# Patient Record
Sex: FEMALE | Race: ASIAN | Employment: FULL TIME | URBAN - METROPOLITAN AREA
[De-identification: names, ages, dates, MRNs, and addresses within clinical notes are randomized per-mention and may not be internally consistent; named-entity substitution may affect disease eponyms.]

---

## 2022-02-08 ENCOUNTER — OFFICE VISIT (OUTPATIENT)
Dept: INTERNAL MEDICINE CLINIC | Facility: CLINIC | Age: 52
End: 2022-02-08
Payer: COMMERCIAL

## 2022-02-08 ENCOUNTER — LAB ENCOUNTER (OUTPATIENT)
Dept: LAB | Age: 52
End: 2022-02-08
Attending: INTERNAL MEDICINE
Payer: COMMERCIAL

## 2022-02-08 VITALS
TEMPERATURE: 99 F | HEART RATE: 64 BPM | HEIGHT: 65 IN | SYSTOLIC BLOOD PRESSURE: 120 MMHG | BODY MASS INDEX: 22.33 KG/M2 | WEIGHT: 134 LBS | DIASTOLIC BLOOD PRESSURE: 80 MMHG

## 2022-02-08 DIAGNOSIS — R52 PAIN: ICD-10-CM

## 2022-02-08 DIAGNOSIS — L65.9 ALOPECIA: ICD-10-CM

## 2022-02-08 DIAGNOSIS — R10.31 RIGHT GROIN PAIN: ICD-10-CM

## 2022-02-08 DIAGNOSIS — Z00.00 PHYSICAL EXAM: ICD-10-CM

## 2022-02-08 DIAGNOSIS — I10 HYPERTENSION, UNSPECIFIED TYPE: ICD-10-CM

## 2022-02-08 DIAGNOSIS — Z00.00 PHYSICAL EXAM: Primary | ICD-10-CM

## 2022-02-08 LAB
ALBUMIN SERPL-MCNC: 4.1 G/DL (ref 3.4–5)
ALBUMIN/GLOB SERPL: 1.3 {RATIO} (ref 1–2)
ALP LIVER SERPL-CCNC: 74 U/L
ALT SERPL-CCNC: 16 U/L
ANION GAP SERPL CALC-SCNC: 5 MMOL/L (ref 0–18)
BASOPHILS # BLD AUTO: 0.03 X10(3) UL (ref 0–0.2)
BASOPHILS NFR BLD AUTO: 0.5 %
BILIRUB SERPL-MCNC: 0.6 MG/DL (ref 0.1–2)
BUN BLD-MCNC: 9 MG/DL (ref 7–18)
BUN/CREAT SERPL: 23.1 (ref 10–20)
CALCIUM BLD-MCNC: 9 MG/DL (ref 8.5–10.1)
CHOLEST SERPL-MCNC: 175 MG/DL (ref ?–200)
CO2 SERPL-SCNC: 30 MMOL/L (ref 21–32)
CREAT BLD-MCNC: 0.39 MG/DL
DEPRECATED RDW RBC AUTO: 43.1 FL (ref 35.1–46.3)
EOSINOPHIL # BLD AUTO: 0.04 X10(3) UL (ref 0–0.7)
EOSINOPHIL NFR BLD AUTO: 0.6 %
ERYTHROCYTE [DISTWIDTH] IN BLOOD BY AUTOMATED COUNT: 12.5 % (ref 11–15)
EST. AVERAGE GLUCOSE BLD GHB EST-MCNC: 105 MG/DL (ref 68–126)
FASTING PATIENT LIPID ANSWER: YES
FASTING STATUS PATIENT QL REPORTED: YES
GLOBULIN PLAS-MCNC: 3.2 G/DL (ref 2.8–4.4)
GLUCOSE BLD-MCNC: 81 MG/DL (ref 70–99)
HBA1C MFR BLD: 5.3 % (ref ?–5.7)
HCT VFR BLD AUTO: 39.8 %
HDLC SERPL-MCNC: 49 MG/DL (ref 40–59)
HGB BLD-MCNC: 13.4 G/DL
IMM GRANULOCYTES # BLD AUTO: 0.02 X10(3) UL (ref 0–1)
IMM GRANULOCYTES NFR BLD: 0.3 %
LDLC SERPL CALC-MCNC: 116 MG/DL (ref ?–100)
LYMPHOCYTES # BLD AUTO: 1.6 X10(3) UL (ref 1–4)
LYMPHOCYTES NFR BLD AUTO: 26 %
MCH RBC QN AUTO: 31.5 PG (ref 26–34)
MCHC RBC AUTO-ENTMCNC: 33.7 G/DL (ref 31–37)
MCV RBC AUTO: 93.6 FL
MONOCYTES # BLD AUTO: 0.41 X10(3) UL (ref 0.1–1)
NEUTROPHILS # BLD AUTO: 4.06 X10 (3) UL (ref 1.5–7.7)
NEUTROPHILS # BLD AUTO: 4.06 X10(3) UL (ref 1.5–7.7)
NEUTROPHILS NFR BLD AUTO: 65.9 %
NONHDLC SERPL-MCNC: 126 MG/DL (ref ?–130)
OSMOLALITY SERPL CALC.SUM OF ELEC: 290 MOSM/KG (ref 275–295)
PLATELET # BLD AUTO: 235 10(3)UL (ref 150–450)
POTASSIUM SERPL-SCNC: 3.5 MMOL/L (ref 3.5–5.1)
PROT SERPL-MCNC: 7.3 G/DL (ref 6.4–8.2)
RBC # BLD AUTO: 4.25 X10(6)UL
SODIUM SERPL-SCNC: 141 MMOL/L (ref 136–145)
TRIGL SERPL-MCNC: 50 MG/DL (ref 30–149)
TSI SER-ACNC: 1.56 MIU/ML (ref 0.36–3.74)
VLDLC SERPL CALC-MCNC: 9 MG/DL (ref 0–30)
WBC # BLD AUTO: 6.2 X10(3) UL (ref 4–11)

## 2022-02-08 PROCEDURE — 36415 COLL VENOUS BLD VENIPUNCTURE: CPT

## 2022-02-08 PROCEDURE — 85025 COMPLETE CBC W/AUTO DIFF WBC: CPT

## 2022-02-08 PROCEDURE — 99386 PREV VISIT NEW AGE 40-64: CPT | Performed by: INTERNAL MEDICINE

## 2022-02-08 PROCEDURE — 3074F SYST BP LT 130 MM HG: CPT | Performed by: INTERNAL MEDICINE

## 2022-02-08 PROCEDURE — 80053 COMPREHEN METABOLIC PANEL: CPT

## 2022-02-08 PROCEDURE — 3008F BODY MASS INDEX DOCD: CPT | Performed by: INTERNAL MEDICINE

## 2022-02-08 PROCEDURE — 83036 HEMOGLOBIN GLYCOSYLATED A1C: CPT

## 2022-02-08 PROCEDURE — 80061 LIPID PANEL: CPT

## 2022-02-08 PROCEDURE — 84443 ASSAY THYROID STIM HORMONE: CPT

## 2022-02-08 PROCEDURE — 3079F DIAST BP 80-89 MM HG: CPT | Performed by: INTERNAL MEDICINE

## 2022-02-08 RX ORDER — AMLODIPINE BESYLATE 10 MG/1
10 TABLET ORAL DAILY
COMMUNITY

## 2022-03-30 ENCOUNTER — LAB ENCOUNTER (OUTPATIENT)
Dept: LAB | Age: 52
End: 2022-03-30
Attending: INTERNAL MEDICINE
Payer: COMMERCIAL

## 2022-03-30 ENCOUNTER — OFFICE VISIT (OUTPATIENT)
Dept: DERMATOLOGY CLINIC | Facility: CLINIC | Age: 52
End: 2022-03-30
Payer: COMMERCIAL

## 2022-03-30 ENCOUNTER — HOSPITAL ENCOUNTER (OUTPATIENT)
Dept: MAMMOGRAPHY | Facility: HOSPITAL | Age: 52
Discharge: HOME | End: 2022-03-30
Attending: INTERNAL MEDICINE
Payer: COMMERCIAL

## 2022-03-30 DIAGNOSIS — M25.50 ARTHRALGIA, UNSPECIFIED JOINT: ICD-10-CM

## 2022-03-30 DIAGNOSIS — L65.9 ALOPECIA: Primary | ICD-10-CM

## 2022-03-30 DIAGNOSIS — L65.9 ALOPECIA: ICD-10-CM

## 2022-03-30 DIAGNOSIS — L30.9 DERMATITIS: ICD-10-CM

## 2022-03-30 DIAGNOSIS — Z00.00 PHYSICAL EXAM: ICD-10-CM

## 2022-03-30 LAB
CRP SERPL-MCNC: <0.29 MG/DL (ref ?–0.3)
DEPRECATED HBV CORE AB SER IA-ACNC: 67.7 NG/ML
DHEA-S SERPL-MCNC: 90.6 UG/DL
HCYS SERPL-SCNC: 2.4 UMOL/L (ref 3.2–10.7)
VIT B12 SERPL-MCNC: 577 PG/ML (ref 193–986)
VIT D+METAB SERPL-MCNC: 17.1 NG/ML (ref 30–100)

## 2022-03-30 PROCEDURE — 99204 OFFICE O/P NEW MOD 45 MIN: CPT | Performed by: DERMATOLOGY

## 2022-03-30 PROCEDURE — 83921 ORGANIC ACID SINGLE QUANT: CPT

## 2022-03-30 PROCEDURE — 84402 ASSAY OF FREE TESTOSTERONE: CPT

## 2022-03-30 PROCEDURE — 86039 ANTINUCLEAR ANTIBODIES (ANA): CPT

## 2022-03-30 PROCEDURE — 36415 COLL VENOUS BLD VENIPUNCTURE: CPT

## 2022-03-30 PROCEDURE — 86235 NUCLEAR ANTIGEN ANTIBODY: CPT

## 2022-03-30 PROCEDURE — 82728 ASSAY OF FERRITIN: CPT

## 2022-03-30 PROCEDURE — 83090 ASSAY OF HOMOCYSTEINE: CPT

## 2022-03-30 PROCEDURE — 84403 ASSAY OF TOTAL TESTOSTERONE: CPT

## 2022-03-30 PROCEDURE — 86140 C-REACTIVE PROTEIN: CPT

## 2022-03-30 PROCEDURE — 86038 ANTINUCLEAR ANTIBODIES: CPT

## 2022-03-30 PROCEDURE — 86225 DNA ANTIBODY NATIVE: CPT

## 2022-03-30 PROCEDURE — 77067 SCR MAMMO BI INCL CAD: CPT | Performed by: INTERNAL MEDICINE

## 2022-03-30 PROCEDURE — 77063 BREAST TOMOSYNTHESIS BI: CPT | Performed by: INTERNAL MEDICINE

## 2022-03-30 PROCEDURE — 82306 VITAMIN D 25 HYDROXY: CPT

## 2022-03-30 PROCEDURE — 82607 VITAMIN B-12: CPT

## 2022-03-30 PROCEDURE — 82627 DEHYDROEPIANDROSTERONE: CPT

## 2022-03-30 RX ORDER — FLUOCINONIDE 0.5 MG/G
1 OINTMENT TOPICAL 2 TIMES DAILY
Qty: 30 G | Refills: 2 | Status: SHIPPED | OUTPATIENT
Start: 2022-03-30 | End: 2022-04-29

## 2022-03-31 LAB — NUCLEAR IGG TITR SER IF: POSITIVE {TITER}

## 2022-04-01 LAB — DSDNA AB TITR SER: <10 {TITER}

## 2022-04-02 LAB — ANA NUCLEOLAR TITR SER IF: 320 {TITER}

## 2022-04-03 LAB — MMA: <0.1 UMOL/L

## 2022-04-06 LAB
ENA SM IGG SER QL: NEGATIVE
ENA SM+RNP AB SER QL: NEGATIVE
ENA SS-A AB SER QL IA: NEGATIVE
ENA SS-B AB SER QL IA: NEGATIVE
TESTOSTERONE, FREE, S: 0.17 NG/DL
TESTOSTERONE, TOTAL, S: 10 NG/DL

## 2022-04-13 ENCOUNTER — TELEPHONE (OUTPATIENT)
Dept: DERMATOLOGY CLINIC | Facility: CLINIC | Age: 52
End: 2022-04-13

## 2022-04-13 NOTE — TELEPHONE ENCOUNTER
Patient reviewed her recent  test results via [address] [place] [address]. Wants to discuss results with dr CARDOZA [facility] nurse.

## 2022-04-19 ENCOUNTER — TELEPHONE (OUTPATIENT)
Dept: DERMATOLOGY CLINIC | Facility: CLINIC | Age: 52
End: 2022-04-19

## 2022-04-21 ENCOUNTER — OFFICE VISIT (OUTPATIENT)
Dept: GASTROENTEROLOGY | Facility: CLINIC | Age: 52
End: 2022-04-21
Payer: COMMERCIAL

## 2022-04-21 VITALS
HEIGHT: 65 IN | BODY MASS INDEX: 22.36 KG/M2 | WEIGHT: 134.19 LBS | DIASTOLIC BLOOD PRESSURE: 90 MMHG | HEART RATE: 82 BPM | SYSTOLIC BLOOD PRESSURE: 134 MMHG

## 2022-04-21 DIAGNOSIS — R10.10 UPPER ABDOMINAL PAIN: ICD-10-CM

## 2022-04-21 DIAGNOSIS — Z12.11 SCREENING FOR COLORECTAL CANCER: ICD-10-CM

## 2022-04-21 DIAGNOSIS — Z12.12 SCREENING FOR COLORECTAL CANCER: ICD-10-CM

## 2022-04-21 DIAGNOSIS — R93.2 ABNORMAL CT SCAN, GALLBLADDER: Primary | ICD-10-CM

## 2022-04-21 PROCEDURE — 3008F BODY MASS INDEX DOCD: CPT | Performed by: INTERNAL MEDICINE

## 2022-04-21 PROCEDURE — 99243 OFF/OP CNSLTJ NEW/EST LOW 30: CPT | Performed by: INTERNAL MEDICINE

## 2022-04-21 PROCEDURE — 3075F SYST BP GE 130 - 139MM HG: CPT | Performed by: INTERNAL MEDICINE

## 2022-04-21 PROCEDURE — 3080F DIAST BP >= 90 MM HG: CPT | Performed by: INTERNAL MEDICINE

## 2022-04-23 ENCOUNTER — TELEPHONE (OUTPATIENT)
Dept: GASTROENTEROLOGY | Facility: CLINIC | Age: 52
End: 2022-04-23

## 2022-04-23 DIAGNOSIS — R10.10 UPPER ABDOMINAL PAIN: Primary | ICD-10-CM

## 2022-04-23 DIAGNOSIS — Z12.11 SCREEN FOR COLON CANCER: ICD-10-CM

## 2022-04-23 NOTE — TELEPHONE ENCOUNTER
Please contact the patient to schedule a screening colonoscopy and an EGD for upper abdominal pain following a split dose MiraLAX/Gatorade preparation and monitored anesthesia care.

## 2022-04-23 NOTE — PATIENT INSTRUCTIONS
1. Proceed with gallbladder ultrasound as scheduled. 2.  I will have the office contact you to arrange a screening colonoscopy and an upper endoscopy to investigate upper abdominal pain.

## 2022-04-24 ENCOUNTER — PATIENT MESSAGE (OUTPATIENT)
Dept: INTERNAL MEDICINE CLINIC | Facility: CLINIC | Age: 52
End: 2022-04-24

## 2022-04-25 ENCOUNTER — HOSPITAL ENCOUNTER (OUTPATIENT)
Dept: ULTRASOUND IMAGING | Facility: HOSPITAL | Age: 52
Discharge: HOME | End: 2022-04-25
Attending: INTERNAL MEDICINE
Payer: COMMERCIAL

## 2022-04-25 ENCOUNTER — TELEPHONE (OUTPATIENT)
Dept: INTERNAL MEDICINE CLINIC | Facility: CLINIC | Age: 52
End: 2022-04-25

## 2022-04-25 DIAGNOSIS — R52 PAIN: ICD-10-CM

## 2022-04-25 PROCEDURE — 76700 US EXAM ABDOM COMPLETE: CPT | Performed by: INTERNAL MEDICINE

## 2022-04-25 NOTE — TELEPHONE ENCOUNTER
I spoke with patient and relayed MDs message-verbalized understanding. She states that she saw Dr. Glory Haynes for Alopecia. She mentioned her hip, neck and hand pain. Dr. Glory Haynes ordered labs. JULI was elevated and patient was referred to rheumatology. If okay, she plans to see rheumatologist first and then f/u with Dr. Kennedy Larry in-office to re-eval treatment plan. She asked about Rheumatology recommendation. To Dr. Kamla Rodriguez: I mentioned Dr. Berenice Mejia. However, do you have any personal recommendations?

## 2022-04-25 NOTE — TELEPHONE ENCOUNTER
The ultrasound ordered as an abdominal ultrasound--this will not be looking at the hip as ultrasound is not a good test for hip or muscle evaluation. At patient's appointment in February we talked about her right hip pain being most likely musculoskeletal and recommended physical therapy--I do not see that patient went for this. If she continues to have right hip pain, we should pursue physical therapy.   If no improvement with physical therapy, will need follow-up office visit for evaluation

## 2022-04-26 NOTE — TELEPHONE ENCOUNTER
Patient called and relayed Dr Raimundo Calvert message. Patient verbalized understanding with no further questions noted.

## 2022-05-10 ENCOUNTER — HOSPITAL ENCOUNTER (OUTPATIENT)
Dept: GENERAL RADIOLOGY | Facility: HOSPITAL | Age: 52
Discharge: HOME | End: 2022-05-10
Attending: INTERNAL MEDICINE
Payer: COMMERCIAL

## 2022-05-10 ENCOUNTER — PATIENT MESSAGE (OUTPATIENT)
Dept: INTERNAL MEDICINE CLINIC | Facility: CLINIC | Age: 52
End: 2022-05-10

## 2022-05-10 ENCOUNTER — OFFICE VISIT (OUTPATIENT)
Dept: INTERNAL MEDICINE CLINIC | Facility: CLINIC | Age: 52
End: 2022-05-10
Payer: COMMERCIAL

## 2022-05-10 VITALS
TEMPERATURE: 98 F | HEART RATE: 77 BPM | WEIGHT: 134.63 LBS | DIASTOLIC BLOOD PRESSURE: 88 MMHG | OXYGEN SATURATION: 98 % | HEIGHT: 65 IN | BODY MASS INDEX: 22.43 KG/M2 | SYSTOLIC BLOOD PRESSURE: 138 MMHG

## 2022-05-10 DIAGNOSIS — R76.8 ANA POSITIVE: ICD-10-CM

## 2022-05-10 DIAGNOSIS — R10.31 RIGHT GROIN PAIN: Primary | ICD-10-CM

## 2022-05-10 DIAGNOSIS — R10.31 RIGHT GROIN PAIN: ICD-10-CM

## 2022-05-10 DIAGNOSIS — I10 HYPERTENSION, UNSPECIFIED TYPE: ICD-10-CM

## 2022-05-10 DIAGNOSIS — E55.9 VITAMIN D DEFICIENCY: ICD-10-CM

## 2022-05-10 PROCEDURE — 3008F BODY MASS INDEX DOCD: CPT | Performed by: INTERNAL MEDICINE

## 2022-05-10 PROCEDURE — 3079F DIAST BP 80-89 MM HG: CPT | Performed by: INTERNAL MEDICINE

## 2022-05-10 PROCEDURE — 3075F SYST BP GE 130 - 139MM HG: CPT | Performed by: INTERNAL MEDICINE

## 2022-05-10 PROCEDURE — 73502 X-RAY EXAM HIP UNI 2-3 VIEWS: CPT | Performed by: INTERNAL MEDICINE

## 2022-05-10 PROCEDURE — 99213 OFFICE O/P EST LOW 20 MIN: CPT | Performed by: INTERNAL MEDICINE

## 2022-05-10 RX ORDER — ERGOCALCIFEROL 1.25 MG/1
50000 CAPSULE ORAL WEEKLY
Qty: 12 CAPSULE | Refills: 1 | Status: SHIPPED | OUTPATIENT
Start: 2022-05-10

## 2022-05-10 NOTE — TELEPHONE ENCOUNTER
From: Kristel Zee  To: Clara Gandhi MD  Sent: 5/10/2022 2:49 PM CDT  Subject: [place] Referral Request    Hi Dr. Johana Lynch requires lab results, any progress notes and written referral before they assign a rheumatologist. Their fax is . Let me know if you have any questions. I will ensure they communicate findings with you directly.      Jesse Menchaca   [phone]

## 2022-05-11 NOTE — TELEPHONE ENCOUNTER
Rheum referral along with derm notes and labs faxed to [facility] rheumatology at number below, confirmation received.

## 2022-07-06 RX ORDER — SODIUM PICOSULFATE, MAGNESIUM OXIDE, AND ANHYDROUS CITRIC ACID 10; 3.5; 12 MG/160ML; G/160ML; G/160ML
1 LIQUID ORAL ONCE
Qty: 1 EACH | Refills: 0 | Status: SHIPPED | OUTPATIENT
Start: 2022-07-06 | End: 2022-07-06

## 2022-07-07 ENCOUNTER — TELEPHONE (OUTPATIENT)
Dept: GASTROENTEROLOGY | Facility: CLINIC | Age: 52
End: 2022-07-07

## 2022-07-08 ENCOUNTER — HOSPITAL ENCOUNTER (EMERGENCY)
Facility: HOSPITAL | Age: 52
Discharge: HOME | End: 2022-07-08
Attending: EMERGENCY MEDICINE
Payer: COMMERCIAL

## 2022-07-08 ENCOUNTER — TELEPHONE (OUTPATIENT)
Dept: INTERNAL MEDICINE CLINIC | Facility: CLINIC | Age: 52
End: 2022-07-08

## 2022-07-08 VITALS
SYSTOLIC BLOOD PRESSURE: 151 MMHG | OXYGEN SATURATION: 97 % | HEART RATE: 96 BPM | RESPIRATION RATE: 16 BRPM | TEMPERATURE: 100 F | DIASTOLIC BLOOD PRESSURE: 106 MMHG

## 2022-07-08 DIAGNOSIS — U07.1 COVID-19 VIRUS INFECTION: Primary | ICD-10-CM

## 2022-07-08 LAB — SARS-COV-2 RNA RESP QL NAA+PROBE: DETECTED

## 2022-07-08 PROCEDURE — 99283 EMERGENCY DEPT VISIT LOW MDM: CPT

## 2022-07-08 RX ORDER — ACETAMINOPHEN 500 MG
1000 TABLET ORAL ONCE
Status: COMPLETED | OUTPATIENT
Start: 2022-07-08 | End: 2022-07-08

## 2022-07-08 NOTE — TELEPHONE ENCOUNTER
Patient calling for Covid test.  Did home Covid/Flu test results negative for both flu and covid  Patient feels she may not have put swab in far enough. Three days of fever. Has fever today of 101.2., body aches, sore throat and fatigue. No known exposure to Covid, has been very careful. Asking for Covid Antibody medication.     Patient has lupus    [facility]    Best call back number [phone]

## 2022-07-08 NOTE — TELEPHONE ENCOUNTER
Spoke to patient and relayed MD message. Pt verbalized understanding and agrees with plan. Will be going to UC today.

## 2022-07-08 NOTE — TELEPHONE ENCOUNTER
To Dr Alba Cardenas  Please advise        Respiratory infection triage:  7/5 Negative home test   7/6 Negative PCR does not feel that she placed the swab far enough the nostril bc she sneezed. Fever:  []  No fever  [x]  Fever>100.4 101.2 this am,  highest fever 101.7 last night  Cough:  [] Tight cough  [x] Cough with exertion  [] Dry cough  [x] Sputum production, Color: yellow phlegm  Breathing:  [x] Mild shortness of breath interfering with activity  [] Wheezing  [] Pain with deep breathing  [] Using inhaler    Other symptoms:  [x] Sore throat  [x] Difficulty swallowing  [x] Nasal drainage/congestion  Yellow nasal discharge  [x] Sinus congestion/pressure  [] Ear pain  [x] Body aches and fatigue  [] Poor appetite  [] Loss of sense of smell   [] Loss of sense of taste  []Conjunctivitis? [] Any recent travel? [] Any sick contacts? [] Are you a healthcare worker? ADDITIONAL NOTES:  Also C/O frontal headache and nausea, no vomitting. Taking ibuprofen 600 mg 4 times a day. Requesting COVBRIANA nixon / Javy You due to her Lupus             Notified patient that we will route this message to the doctor and see what their recommendations would be. In the meantime, if anything worsens, they were advised to call back or seek emergent evaluation.

## 2022-07-08 NOTE — TELEPHONE ENCOUNTER
We would not prescribe COVID medication unless we knew patient is COVID-positive. She has had negative home COVID test.  With her symptoms, I recommend urgent care or ER visit so that we can do PCR test and decide if any additional treatment is needed.

## 2022-07-13 NOTE — TELEPHONE ENCOUNTER
Schedulers:  Patient called to follow up on scheduling procedure. Please call  (positive covid on 7/8/2022 so would have to follow protocol)    Thank you    Royal Alfredo 4:11 PM  Note  Patient is calling to follow up on scheduling procedure.

## 2022-07-13 NOTE — TELEPHONE ENCOUNTER
Please see 4/23/2022 encounter for further documentation to avoid confusion about scheduling colonoscopy/EGD since orders in that encounter.

## 2022-07-14 RX ORDER — SODIUM PICOSULFATE, MAGNESIUM OXIDE, AND ANHYDROUS CITRIC ACID 10; 3.5; 12 MG/160ML; G/160ML; G/160ML
1 LIQUID ORAL ONCE
Qty: 1 EACH | Refills: 0 | Status: SHIPPED | OUTPATIENT
Start: 2022-07-14 | End: 2022-07-14

## 2022-07-14 NOTE — TELEPHONE ENCOUNTER
Scheduled for:  Colonoscopy [phone] & EGD [address]  Provider Name:  Dr. Temi Elias  Date:  12/9/2022  Location:  [facility]  Sedation:  MAC  Time:  1:00 pm, (pt is aware that [facility] will call the day before to confirm arrival time)  Prep:  Msg sent to   Meds/Allergies Reconciled?: Yes  Diagnosis with codes:  Upper abdominal pain R10.10; Screening for colon cancer Z12.11  Was patient informed to call insurance with codes (Y/N):  Yes  Referral sent?:  Yes  [facility] or [facility]  notified?:  Electronic case request was sent to [facility] via [employer]. Medication Orders:  N/A  Misc Orders:  Patient was informed that they will need a COVID 19 test prior to their procedure. Patient verbally understood & will await a phone call from [facility] to schedule. Further instructions given by staff: Informed patient that prep instructions will be sent via [employer] once we get response on prep.

## 2022-07-14 NOTE — TELEPHONE ENCOUNTER
Dr. Ashleigh Quesada -    Patient is wondering if she can do the same prep that her mom will be doing which looks to be Clenpiq instead of Miralax/Gatorade. Please advise, thank you!

## 2022-08-18 ENCOUNTER — MED REC SCAN ONLY (OUTPATIENT)
Dept: INTERNAL MEDICINE CLINIC | Facility: CLINIC | Age: 52
End: 2022-08-18

## 2022-10-20 ENCOUNTER — TELEPHONE (OUTPATIENT)
Dept: INTERNAL MEDICINE CLINIC | Facility: CLINIC | Age: 52
End: 2022-10-20

## 2022-10-20 ENCOUNTER — TELEPHONE (OUTPATIENT)
Dept: GASTROENTEROLOGY | Facility: CLINIC | Age: 52
End: 2022-10-20

## 2022-10-20 DIAGNOSIS — R19.7 DIARRHEA, UNSPECIFIED TYPE: Primary | ICD-10-CM

## 2022-10-20 DIAGNOSIS — R10.9 ABDOMINAL PAIN, UNSPECIFIED ABDOMINAL LOCATION: Primary | ICD-10-CM

## 2022-10-20 NOTE — TELEPHONE ENCOUNTER
Pt's  tested positive for CDif  Pt is asking for orders so she can be tested as well  Pt experiencing mild stomach cramps & diarrhea    Pt has a flight tomorrow at 5pm - hoping to get test & results before traveling     [phone]    [facility] at the [facility] now  test kit for her mother

## 2022-10-20 NOTE — TELEPHONE ENCOUNTER
Pt. State that her  is positive for C-diff, and she is going out of town tomorrow, so she is very concerned. Pt states that she is having stomach aches and loose stools, so she wants to know if she is able to get tested for c-dif? Pt states that she had intercourse with the pt as she did not know that pt was positive, as pt just found out about 30 minutes that he is positive.

## 2022-10-21 ENCOUNTER — LAB ENCOUNTER (OUTPATIENT)
Dept: LAB | Facility: HOSPITAL | Age: 52
End: 2022-10-21
Attending: INTERNAL MEDICINE
Payer: COMMERCIAL

## 2022-10-21 DIAGNOSIS — R10.9 ABDOMINAL PAIN, UNSPECIFIED ABDOMINAL LOCATION: Primary | ICD-10-CM

## 2022-10-21 LAB — C DIFF TOX B STL QL: NEGATIVE

## 2022-10-21 PROCEDURE — 87493 C DIFF AMPLIFIED PROBE: CPT

## 2022-10-21 NOTE — TELEPHONE ENCOUNTER
I spoke to the pt    She is having loose stool    She called Dr Damaris Dos Santos and he ordered a c diff stool test for her.     They are washing all hard surfaces with bleach and not sharing drinking or eating utensils    They will not share the same bathroom for a while

## 2022-10-31 ENCOUNTER — TELEPHONE (OUTPATIENT)
Facility: CLINIC | Age: 52
End: 2022-10-31

## 2022-10-31 DIAGNOSIS — R19.7 DIARRHEA, UNSPECIFIED TYPE: Primary | ICD-10-CM

## 2022-10-31 NOTE — TELEPHONE ENCOUNTER
Pt is calling because having consistent diarrhea for the last 8 days. Tested negative for C-Diff, but  tested positive. Everyday is loose stools, and a lot of abdominal discomfort.      She wants to see if they can run another stool test.

## 2022-11-01 NOTE — TELEPHONE ENCOUNTER
I have ordered a C. difficile toxin assay, however, I would also check other stool tests looking for other infections and inflammation. These were ordered as well.

## 2022-11-03 ENCOUNTER — LAB ENCOUNTER (OUTPATIENT)
Dept: LAB | Facility: HOSPITAL | Age: 52
End: 2022-11-03
Attending: INTERNAL MEDICINE
Payer: COMMERCIAL

## 2022-11-03 DIAGNOSIS — R19.7 DIARRHEA, UNSPECIFIED TYPE: ICD-10-CM

## 2022-11-03 LAB
C DIFF TOX B STL QL: NEGATIVE
CRYPTOSP AG STL QL IA: NEGATIVE
G LAMBLIA AG STL QL IA: NEGATIVE

## 2022-11-03 PROCEDURE — 87329 GIARDIA AG IA: CPT

## 2022-11-03 PROCEDURE — 87272 CRYPTOSPORIDIUM AG IF: CPT

## 2022-11-03 PROCEDURE — 87427 SHIGA-LIKE TOXIN AG IA: CPT

## 2022-11-03 PROCEDURE — 87046 STOOL CULTR AEROBIC BACT EA: CPT

## 2022-11-03 PROCEDURE — 87045 FECES CULTURE AEROBIC BACT: CPT

## 2022-11-03 PROCEDURE — 87077 CULTURE AEROBIC IDENTIFY: CPT

## 2022-11-03 PROCEDURE — 87493 C DIFF AMPLIFIED PROBE: CPT

## 2022-11-03 PROCEDURE — 83993 ASSAY FOR CALPROTECTIN FECAL: CPT

## 2022-11-08 LAB — CALPROTECTIN STL-MCNT: 9.48 ΜG/G (ref ?–50)

## 2022-11-21 ENCOUNTER — TELEPHONE (OUTPATIENT)
Facility: CLINIC | Age: 52
End: 2022-11-21

## 2022-11-21 NOTE — TELEPHONE ENCOUNTER
Pt is calling because she needs her prep- medication for her procedure on 12/9/2022. Dr. Jovana Huertas said they are suppose to have the lighter prep so its more tolerable to swallow.

## 2022-11-21 NOTE — TELEPHONE ENCOUNTER
Spoke to [facility] from pharmacy confirmed prep at pharmacy and will contact patient when ready for . Spoke to patient and relayed message above. Patient verbalized understanding and had no further questions at this time.

## 2022-11-22 ENCOUNTER — TELEPHONE (OUTPATIENT)
Facility: CLINIC | Age: 52
End: 2022-11-22

## 2022-11-22 NOTE — TELEPHONE ENCOUNTER
I spoke to the pt and let her know her Clenpiq bowel prep was not covered by insurance    I let her know I sent the M[facility]Lax[facility]rade [facility]  She voices understanding

## 2022-11-23 ENCOUNTER — TELEPHONE (OUTPATIENT)
Dept: INTERNAL MEDICINE CLINIC | Facility: CLINIC | Age: 52
End: 2022-11-23

## 2022-11-23 NOTE — TELEPHONE ENCOUNTER
Spoke with patient. Relayed MD message. Pt verbalized understanding. She is on her way to [place] for the [address], but will report to ER there for evaluation. Pt requested a [employer] msg be sent to her with MD response/recommendation--sent. Nursing to F/up.
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
## 2022-12-06 ENCOUNTER — LAB REQUISITION (OUTPATIENT)
Dept: SURGERY | Age: 52
End: 2022-12-06
Payer: COMMERCIAL

## 2022-12-06 DIAGNOSIS — Z01.818 PREOP EXAMINATION: ICD-10-CM

## 2022-12-07 LAB — SARS-COV-2 RNA RESP QL NAA+PROBE: NOT DETECTED

## 2022-12-09 ENCOUNTER — SURGERY CENTER DOCUMENTATION (OUTPATIENT)
Dept: SURGERY | Age: 52
End: 2022-12-09

## 2022-12-09 ENCOUNTER — LAB REQUISITION (OUTPATIENT)
Dept: SURGERY | Age: 52
End: 2022-12-09
Payer: COMMERCIAL

## 2022-12-09 DIAGNOSIS — Z12.11 SPECIAL SCREENING FOR MALIGNANT NEOPLASMS, COLON: ICD-10-CM

## 2022-12-09 DIAGNOSIS — R10.10 UPPER ABDOMINAL PAIN: ICD-10-CM

## 2022-12-09 PROCEDURE — 88305 TISSUE EXAM BY PATHOLOGIST: CPT | Performed by: INTERNAL MEDICINE

## 2022-12-09 PROCEDURE — 88312 SPECIAL STAINS GROUP 1: CPT | Performed by: INTERNAL MEDICINE

## 2022-12-09 NOTE — PROCEDURES
[address] Endoscopy Report      Date of Procedure:  12/09/22      Preoperative Diagnosis:  1. Colorectal cancer screening  2. Upper abdominal discomfort      Postoperative Diagnosis:  1. Normal colonoscopy  2. Normal examination of the upper digestive tract      Procedure:    Screening colonoscopy  Esophagogastroduodenoscopy with biopsy      Surgeon:  Cortney Murray M.D. Anesthesia:  Monitored anesthesia care  Cecal withdrawal time: 21 minutes  EBL:  Insignificant      Brief History: This is a 49year old female who presents for a screening colonoscopy. Her last examination was several years prior and incomplete as the procedure was performed without sedation. She also has a previous history of laparoscopically diagnosed endometriosis possibly with colonic involvement. The patient had been experiencing looser stools recently with negative stool studies (negative cultures and normal fecal calprotectin) which has resolved spontaneously. She has experienced episodic upper abdominal discomfort and a concurrent upper endoscopy is being performed as well. Technique:  After informed consent, the patient was placed in the left lateral recumbent position. Digital rectal examination revealed no palpable intraluminal abnormalities. An Olympus variable stiffness 190 series HD pediatric colonoscope was inserted into the rectum and advanced under direct vision by following the lumen to the terminal ileum. The colon was examined upon withdrawal in the left lateral recumbent position. Following colonoscopy, an Olympus adult HD gastroscope was inserted into the hypopharynx and advanced under direct vision into the esophagus, stomach and duodenum. The endoscope was withdrawn to the stomach where retroflexion of the angulus, body, cardia and fundus was performed. The instrument was straightened, insufflated air and fluid were suctioned and the endoscope was withdrawn.   The procedures were well tolerated without immediate complication. Findings:  The preparation of the colon was excellent. The terminal ileum was examined for 5 cm and visually normal.  The ileocecal valve was well preserved. The visualized colonic mucosa from the cecum to the anal verge was normal with an intact vascular pattern. There were no colonic polyps, definite diverticula, signs of endometriosis, rectosigmoid fixation mass lesions, vascular anomalies or signs of inflammation seen. Retroflexion in the rectum revealed no abnormalities. The esophagus was normal without evidence of ulceration, erosion, stricture, ring or Baker's esophagus. The GE junction and diaphragmatic impression were at 39 cm. No definite hiatal hernia was seen on this study. The stomach distended appropriately with insufflated air. The mucosa of the stomach including cardia, fundus, gastric body and antrum was normal.  Biopsies were obtained from the gastric body and from the gastric antrum and placed in separate containers. The duodenal bulb and post bulbar regions were normal.  Biopsies were obtained from the duodenal bulb and second portion. Impression:  1. Normal screening colonoscopy to the terminal ileum  2. Normal examination of the upper digestive tract    Recommendations:  1. Follow-up biopsy results. 2.  Screening colonoscopy in 10 years. 3.  Further recommendations pending biopsy and clinical course.           Nora Dodson MD  12/9/2022

## 2022-12-17 DIAGNOSIS — A04.8 H. PYLORI INFECTION: Primary | ICD-10-CM

## 2022-12-17 RX ORDER — AMOXICILLIN 500 MG/1
1000 TABLET, FILM COATED ORAL 2 TIMES DAILY
Qty: 56 TABLET | Refills: 0 | Status: SHIPPED | OUTPATIENT
Start: 2022-12-17 | End: 2022-12-31

## 2022-12-17 RX ORDER — CLARITHROMYCIN 500 MG/1
500 TABLET, COATED ORAL 2 TIMES DAILY
Qty: 28 TABLET | Refills: 0 | Status: SHIPPED | OUTPATIENT
Start: 2022-12-17 | End: 2022-12-31

## 2022-12-17 RX ORDER — PANTOPRAZOLE SODIUM 40 MG/1
40 TABLET, DELAYED RELEASE ORAL 2 TIMES DAILY
Qty: 28 TABLET | Refills: 0 | Status: SHIPPED | OUTPATIENT
Start: 2022-12-17

## 2022-12-19 ENCOUNTER — APPOINTMENT (OUTPATIENT)
Dept: ULTRASOUND IMAGING | Facility: HOSPITAL | Age: 52
End: 2022-12-19
Attending: EMERGENCY MEDICINE
Payer: COMMERCIAL

## 2022-12-19 ENCOUNTER — HOSPITAL ENCOUNTER (EMERGENCY)
Facility: HOSPITAL | Age: 52
Discharge: HOME | End: 2022-12-19
Attending: EMERGENCY MEDICINE
Payer: COMMERCIAL

## 2022-12-19 VITALS
HEART RATE: 54 BPM | TEMPERATURE: 98 F | DIASTOLIC BLOOD PRESSURE: 87 MMHG | SYSTOLIC BLOOD PRESSURE: 139 MMHG | BODY MASS INDEX: 21.85 KG/M2 | HEIGHT: 64 IN | WEIGHT: 128 LBS | OXYGEN SATURATION: 100 % | RESPIRATION RATE: 16 BRPM

## 2022-12-19 DIAGNOSIS — R60.0 EDEMA OF LEFT FOOT: Primary | ICD-10-CM

## 2022-12-19 PROCEDURE — 99284 EMERGENCY DEPT VISIT MOD MDM: CPT

## 2022-12-19 PROCEDURE — 93971 EXTREMITY STUDY: CPT | Performed by: EMERGENCY MEDICINE

## 2022-12-19 NOTE — ED QUICK NOTES
To ED via private vehicle for c/o swelling left leg. Patient stated that swelling started around 2100 in the shower. Patient took 3 baby aspirin 12/18 and 2 baby aspirin today. Patient stated that the swelling went down. Patient stated that there was heat to touch and redness 12/18. Color today was deep bluish purple this morning.

## 2022-12-19 NOTE — ED QUICK NOTES
Discharge summary reviewed including medication administration and follow up appointments. Advised to return to the Emergency Department with new or worsening symptoms. Patient verbalized understanding. All questions answered at this time. Discharged home with steady gait.

## 2022-12-19 NOTE — ED INITIAL ASSESSMENT (HPI)
Patient to Er from home sent in by cardiologist. Patient states last night noted redness to top of the left foot with swelling. Patient states swelling has improved today.

## 2022-12-20 ENCOUNTER — TELEPHONE (OUTPATIENT)
Facility: CLINIC | Age: 52
End: 2022-12-20

## 2022-12-20 NOTE — TELEPHONE ENCOUNTER
----- Message from Nahun Mcdaniel MD sent at 12/17/2022  3:22 PM CST -----  Late entry: I spoke to Nela Paulson yesterday at her mother's endoscopy procedure. Her colonoscopy was normal.  The upper endoscopy was visually normal, however, biopsies were positive for H. pylori. I have recommended treatment. I am prescribing a 2-week course of pantoprazole, clarithromycin and amoxicillin. Side effect profile discussed. 6 weeks after treatment she will obtain an H. pylori breath test.  A screening colonoscopy should be repeated in 10 years. GI RNs: Please enter colonoscopy recall for 10 years.

## 2022-12-20 NOTE — TELEPHONE ENCOUNTER
Entered into Epic. Recall CLN in 10 years per Dr. Jesús Renee. Last CLN done 12/9/2022. Recall entered into Patient Outreach for 12/9/2032. Health Maintenance updated.

## 2023-01-24 ENCOUNTER — TELEPHONE (OUTPATIENT)
Facility: CLINIC | Age: 53
End: 2023-01-24

## 2023-01-24 NOTE — TELEPHONE ENCOUNTER
1st reminder letter sent out via mail and [employer] for the following:   Glorine Gums TEST, ADULT (>17) (Order #[id number]) on 12/17/22

## 2023-01-24 NOTE — TELEPHONE ENCOUNTER
I spoke to Esthela. She has had hemorrhoids removed in the past.  The hemorrhoids have been symptomatic over the past 2 years and more so over the past 2 months. She describes pruritus and prolapse. She has been using an OTC [employer] product (no hydrocortisone) without benefit. She will utilize zinc oxide for barrier protection. I have offered a trial of hydrocortisone. She wishes to assess response to the Desitin. She will also make an appointment with surgery if the symptoms continue and are problematic.

## 2023-01-24 NOTE — TELEPHONE ENCOUNTER
Dr. Cinthia Crockett the patient. She is complaining of uncontrollable itching in the rectal area. Last night, her hemorrhoid started bleeding, described the bleeding as like having a light menstrual period. She stated it is protruding approx. 1 cm. She tried all the creams, did sitz bath but only temporary relief. She is a little constipated, I told her to buy OTC stool softener, eat fiber foods. Keep the affected area dry. She decided she would do the procedure. Thanks.

## 2023-02-17 ENCOUNTER — LAB ENCOUNTER (OUTPATIENT)
Dept: LAB | Facility: HOSPITAL | Age: 53
End: 2023-02-17
Attending: INTERNAL MEDICINE
Payer: COMMERCIAL

## 2023-02-17 DIAGNOSIS — A04.8 H. PYLORI INFECTION: ICD-10-CM

## 2023-02-17 PROCEDURE — 83013 H PYLORI (C-13) BREATH: CPT

## 2023-02-20 LAB — H. PYLORI BREATH TEST: NEGATIVE

## 2023-02-28 ENCOUNTER — LAB ENCOUNTER (OUTPATIENT)
Dept: LAB | Age: 53
End: 2023-02-28
Attending: INTERNAL MEDICINE
Payer: COMMERCIAL

## 2023-02-28 ENCOUNTER — OFFICE VISIT (OUTPATIENT)
Dept: INTERNAL MEDICINE CLINIC | Facility: CLINIC | Age: 53
End: 2023-02-28

## 2023-02-28 VITALS
BODY MASS INDEX: 22.53 KG/M2 | OXYGEN SATURATION: 99 % | SYSTOLIC BLOOD PRESSURE: 150 MMHG | WEIGHT: 132 LBS | HEART RATE: 63 BPM | TEMPERATURE: 98 F | HEIGHT: 64 IN | DIASTOLIC BLOOD PRESSURE: 102 MMHG

## 2023-02-28 DIAGNOSIS — I10 HYPERTENSION, UNSPECIFIED TYPE: ICD-10-CM

## 2023-02-28 DIAGNOSIS — M25.551 RIGHT HIP PAIN: ICD-10-CM

## 2023-02-28 DIAGNOSIS — E55.9 VITAMIN D DEFICIENCY: ICD-10-CM

## 2023-02-28 DIAGNOSIS — R76.8 ANA POSITIVE: ICD-10-CM

## 2023-02-28 DIAGNOSIS — M32.9 LUPUS (HCC): ICD-10-CM

## 2023-02-28 DIAGNOSIS — Z00.00 PHYSICAL EXAM: ICD-10-CM

## 2023-02-28 DIAGNOSIS — K59.00 CONSTIPATION, UNSPECIFIED CONSTIPATION TYPE: ICD-10-CM

## 2023-02-28 DIAGNOSIS — Z00.00 PHYSICAL EXAM: Primary | ICD-10-CM

## 2023-02-28 LAB
ALBUMIN SERPL-MCNC: 4 G/DL (ref 3.4–5)
ALBUMIN/GLOB SERPL: 1.3 {RATIO} (ref 1–2)
ALP LIVER SERPL-CCNC: 77 U/L
ALT SERPL-CCNC: 16 U/L
ANION GAP SERPL CALC-SCNC: 3 MMOL/L (ref 0–18)
AST SERPL-CCNC: 16 U/L (ref 15–37)
BASOPHILS # BLD AUTO: 0.03 X10(3) UL (ref 0–0.2)
BASOPHILS NFR BLD AUTO: 0.6 %
BILIRUB SERPL-MCNC: 0.8 MG/DL (ref 0.1–2)
BILIRUB UR QL: NEGATIVE
BUN BLD-MCNC: 8 MG/DL (ref 7–18)
BUN/CREAT SERPL: 15.1 (ref 10–20)
CALCIUM BLD-MCNC: 8.8 MG/DL (ref 8.5–10.1)
CHLORIDE SERPL-SCNC: 108 MMOL/L (ref 98–112)
CHOLEST SERPL-MCNC: 169 MG/DL (ref ?–200)
CLARITY UR: CLEAR
CO2 SERPL-SCNC: 31 MMOL/L (ref 21–32)
COLOR UR: YELLOW
CREAT BLD-MCNC: 0.53 MG/DL
DEPRECATED RDW RBC AUTO: 42.4 FL (ref 35.1–46.3)
EOSINOPHIL # BLD AUTO: 0.05 X10(3) UL (ref 0–0.7)
EOSINOPHIL NFR BLD AUTO: 0.9 %
ERYTHROCYTE [DISTWIDTH] IN BLOOD BY AUTOMATED COUNT: 12.4 % (ref 11–15)
ERYTHROCYTE [SEDIMENTATION RATE] IN BLOOD: 3 MM/HR
EST. AVERAGE GLUCOSE BLD GHB EST-MCNC: 103 MG/DL (ref 68–126)
FASTING PATIENT LIPID ANSWER: YES
FASTING STATUS PATIENT QL REPORTED: YES
GFR SERPLBLD BASED ON 1.73 SQ M-ARVRAT: 112 ML/MIN/1.73M2 (ref 60–?)
GLOBULIN PLAS-MCNC: 3.1 G/DL (ref 2.8–4.4)
GLUCOSE BLD-MCNC: 83 MG/DL (ref 70–99)
GLUCOSE UR-MCNC: NORMAL MG/DL
HBA1C MFR BLD: 5.2 % (ref ?–5.7)
HCT VFR BLD AUTO: 40.3 %
HDLC SERPL-MCNC: 57 MG/DL (ref 40–59)
HGB BLD-MCNC: 13.5 G/DL
HGB UR QL STRIP.AUTO: NEGATIVE
IMM GRANULOCYTES # BLD AUTO: 0.02 X10(3) UL (ref 0–1)
IMM GRANULOCYTES NFR BLD: 0.4 %
KETONES UR-MCNC: NEGATIVE MG/DL
LDLC SERPL CALC-MCNC: 104 MG/DL (ref ?–100)
LEUKOCYTE ESTERASE UR QL STRIP.AUTO: NEGATIVE
LYMPHOCYTES # BLD AUTO: 1.54 X10(3) UL (ref 1–4)
LYMPHOCYTES NFR BLD AUTO: 29.1 %
MCH RBC QN AUTO: 30.9 PG (ref 26–34)
MCHC RBC AUTO-ENTMCNC: 33.5 G/DL (ref 31–37)
MCV RBC AUTO: 92.2 FL
MONOCYTES # BLD AUTO: 0.31 X10(3) UL (ref 0.1–1)
MONOCYTES NFR BLD AUTO: 5.8 %
NEUTROPHILS # BLD AUTO: 3.35 X10 (3) UL (ref 1.5–7.7)
NEUTROPHILS # BLD AUTO: 3.35 X10(3) UL (ref 1.5–7.7)
NEUTROPHILS NFR BLD AUTO: 63.2 %
NITRITE UR QL STRIP.AUTO: NEGATIVE
NONHDLC SERPL-MCNC: 112 MG/DL (ref ?–130)
OSMOLALITY SERPL CALC.SUM OF ELEC: 291 MOSM/KG (ref 275–295)
PH UR: 6.5 [PH] (ref 5–8)
PLATELET # BLD AUTO: 220 10(3)UL (ref 150–450)
POTASSIUM SERPL-SCNC: 3.2 MMOL/L (ref 3.5–5.1)
PROT SERPL-MCNC: 7.1 G/DL (ref 6.4–8.2)
PROT UR-MCNC: NEGATIVE MG/DL
RBC # BLD AUTO: 4.37 X10(6)UL
SODIUM SERPL-SCNC: 142 MMOL/L (ref 136–145)
SP GR UR STRIP: 1.02 (ref 1–1.03)
T4 FREE SERPL-MCNC: 1.2 NG/DL (ref 0.8–1.7)
TRIGL SERPL-MCNC: 36 MG/DL (ref 30–149)
TSI SER-ACNC: 1.08 MIU/ML (ref 0.36–3.74)
UROBILINOGEN UR STRIP-ACNC: NORMAL
VIT D+METAB SERPL-MCNC: 61.6 NG/ML (ref 30–100)
VLDLC SERPL CALC-MCNC: 6 MG/DL (ref 0–30)
WBC # BLD AUTO: 5.3 X10(3) UL (ref 4–11)

## 2023-02-28 PROCEDURE — 80061 LIPID PANEL: CPT

## 2023-02-28 PROCEDURE — 80053 COMPREHEN METABOLIC PANEL: CPT

## 2023-02-28 PROCEDURE — 3080F DIAST BP >= 90 MM HG: CPT | Performed by: INTERNAL MEDICINE

## 2023-02-28 PROCEDURE — 84439 ASSAY OF FREE THYROXINE: CPT

## 2023-02-28 PROCEDURE — 84443 ASSAY THYROID STIM HORMONE: CPT

## 2023-02-28 PROCEDURE — 3077F SYST BP >= 140 MM HG: CPT | Performed by: INTERNAL MEDICINE

## 2023-02-28 PROCEDURE — 85025 COMPLETE CBC W/AUTO DIFF WBC: CPT

## 2023-02-28 PROCEDURE — 3008F BODY MASS INDEX DOCD: CPT | Performed by: INTERNAL MEDICINE

## 2023-02-28 PROCEDURE — 90715 TDAP VACCINE 7 YRS/> IM: CPT | Performed by: INTERNAL MEDICINE

## 2023-02-28 PROCEDURE — 85652 RBC SED RATE AUTOMATED: CPT

## 2023-02-28 PROCEDURE — 83036 HEMOGLOBIN GLYCOSYLATED A1C: CPT

## 2023-02-28 PROCEDURE — 36415 COLL VENOUS BLD VENIPUNCTURE: CPT

## 2023-02-28 PROCEDURE — 82306 VITAMIN D 25 HYDROXY: CPT

## 2023-02-28 PROCEDURE — 99396 PREV VISIT EST AGE 40-64: CPT | Performed by: INTERNAL MEDICINE

## 2023-02-28 PROCEDURE — 90471 IMMUNIZATION ADMIN: CPT | Performed by: INTERNAL MEDICINE

## 2023-02-28 RX ORDER — AMLODIPINE BESYLATE 10 MG/1
10 TABLET ORAL DAILY
Qty: 90 TABLET | Refills: 3 | Status: SHIPPED | OUTPATIENT
Start: 2023-02-28

## 2023-02-28 RX ORDER — ESTRADIOL 0.05 MG/D
1 FILM, EXTENDED RELEASE TRANSDERMAL
COMMUNITY
Start: 2023-02-13

## 2023-02-28 RX ORDER — HYDROCHLOROTHIAZIDE 12.5 MG/1
12.5 TABLET ORAL DAILY
Qty: 90 TABLET | Refills: 3 | Status: SHIPPED | OUTPATIENT
Start: 2023-02-28

## 2023-02-28 RX ORDER — PROGESTERONE 100 MG/1
100 CAPSULE ORAL NIGHTLY
COMMUNITY
Start: 2023-02-08

## 2023-03-01 DIAGNOSIS — E87.6 HYPOKALEMIA: Primary | ICD-10-CM

## 2023-03-01 RX ORDER — POTASSIUM CHLORIDE 1500 MG/1
20 TABLET, FILM COATED, EXTENDED RELEASE ORAL DAILY
Qty: 30 TABLET | Refills: 6 | Status: SHIPPED | OUTPATIENT
Start: 2023-03-01 | End: 2023-03-31

## 2023-03-05 ENCOUNTER — PATIENT MESSAGE (OUTPATIENT)
Dept: INTERNAL MEDICINE CLINIC | Facility: CLINIC | Age: 53
End: 2023-03-05

## 2023-03-07 ENCOUNTER — LAB ENCOUNTER (OUTPATIENT)
Dept: LAB | Age: 53
End: 2023-03-07
Attending: INTERNAL MEDICINE
Payer: COMMERCIAL

## 2023-03-07 DIAGNOSIS — E87.6 HYPOKALEMIA: ICD-10-CM

## 2023-03-07 LAB
ANION GAP SERPL CALC-SCNC: 4 MMOL/L (ref 0–18)
BUN BLD-MCNC: 15 MG/DL (ref 7–18)
BUN/CREAT SERPL: 28.3 (ref 10–20)
CALCIUM BLD-MCNC: 9.3 MG/DL (ref 8.5–10.1)
CHLORIDE SERPL-SCNC: 105 MMOL/L (ref 98–112)
CO2 SERPL-SCNC: 31 MMOL/L (ref 21–32)
CREAT BLD-MCNC: 0.53 MG/DL
FASTING STATUS PATIENT QL REPORTED: NO
GFR SERPLBLD BASED ON 1.73 SQ M-ARVRAT: 112 ML/MIN/1.73M2 (ref 60–?)
GLUCOSE BLD-MCNC: 95 MG/DL (ref 70–99)
OSMOLALITY SERPL CALC.SUM OF ELEC: 291 MOSM/KG (ref 275–295)
POTASSIUM SERPL-SCNC: 4 MMOL/L (ref 3.5–5.1)
SODIUM SERPL-SCNC: 140 MMOL/L (ref 136–145)

## 2023-03-07 PROCEDURE — 82088 ASSAY OF ALDOSTERONE: CPT

## 2023-03-07 PROCEDURE — 80048 BASIC METABOLIC PNL TOTAL CA: CPT

## 2023-03-07 PROCEDURE — 84244 ASSAY OF RENIN: CPT

## 2023-03-07 PROCEDURE — 36415 COLL VENOUS BLD VENIPUNCTURE: CPT

## 2023-03-09 LAB — ALDOSTERONE: 11.6 NG/DL

## 2023-03-11 LAB — RENIN ACTIVITY: 0.4 NG/ML/HR

## 2023-03-17 ENCOUNTER — TELEPHONE (OUTPATIENT)
Facility: CLINIC | Age: 53
End: 2023-03-17

## 2023-03-17 NOTE — TELEPHONE ENCOUNTER
Dr. Aguilar Rueda fax from [facility]. Please see patients' OV notes on 3/14/2023 with Dr Dang Jones in [facility]. Thank you.

## 2023-03-21 ENCOUNTER — HOSPITAL ENCOUNTER (OUTPATIENT)
Dept: ULTRASOUND IMAGING | Age: 53
Discharge: HOME | End: 2023-03-21
Attending: INTERNAL MEDICINE
Payer: COMMERCIAL

## 2023-03-21 DIAGNOSIS — E87.6 HYPOKALEMIA: ICD-10-CM

## 2023-03-21 PROCEDURE — 76775 US EXAM ABDO BACK WALL LIM: CPT | Performed by: INTERNAL MEDICINE

## 2023-03-21 PROCEDURE — 93975 VASCULAR STUDY: CPT | Performed by: INTERNAL MEDICINE

## 2023-07-27 NOTE — TELEPHONE ENCOUNTER
Patient requesting a 3 month supply on   OLANZapine (ZYPREXA) 5 MG tablet   To be sent to [facility].  Patient has appr with MB on 8/30/223 See orders below per Dr. Nelson Bulgarian visit dated 4/21/2022         Proceed with gallbladder ultrasound as scheduled.    I will have the office contact you to arrange a screening colonoscopy and an upper endoscopy to investigate upper abdominal pain

## 2023-11-04 RX ORDER — AMLODIPINE BESYLATE 10 MG/1
10 TABLET ORAL DAILY
Qty: 90 TABLET | Refills: 3 | Status: CANCELLED | OUTPATIENT
Start: 2023-11-04

## 2023-11-04 RX ORDER — HYDROCHLOROTHIAZIDE 12.5 MG/1
12.5 TABLET ORAL DAILY
Qty: 90 TABLET | Refills: 3 | Status: CANCELLED | OUTPATIENT
Start: 2023-11-04

## 2023-12-22 ENCOUNTER — OFFICE VISIT (OUTPATIENT)
Dept: OTOLARYNGOLOGY | Facility: CLINIC | Age: 53
End: 2023-12-22

## 2023-12-22 VITALS — WEIGHT: 134 LBS | BODY MASS INDEX: 22.88 KG/M2 | HEIGHT: 64 IN

## 2023-12-22 DIAGNOSIS — H91.8X3 ASYMMETRICAL HEARING LOSS: Primary | ICD-10-CM

## 2023-12-22 DIAGNOSIS — J34.89 NASAL CRUSTING: ICD-10-CM

## 2023-12-22 DIAGNOSIS — S09.93XA: ICD-10-CM

## 2023-12-22 PROCEDURE — 3008F BODY MASS INDEX DOCD: CPT | Performed by: SPECIALIST

## 2023-12-22 PROCEDURE — 99203 OFFICE O/P NEW LOW 30 MIN: CPT | Performed by: SPECIALIST

## 2023-12-22 NOTE — PATIENT INSTRUCTIONS
Placed on a trial of mupirocin ointment for your nasal crusting. An audiogram was ordered to reevaluate your underlying hearing. To avoid trauma to the buccal mucosa you can try a mouthguard.

## 2024-05-02 ENCOUNTER — APPOINTMENT (OUTPATIENT)
Dept: GENERAL RADIOLOGY | Facility: HOSPITAL | Age: 54
End: 2024-05-02
Attending: EMERGENCY MEDICINE
Payer: COMMERCIAL

## 2024-05-02 ENCOUNTER — HOSPITAL ENCOUNTER (EMERGENCY)
Facility: HOSPITAL | Age: 54
Discharge: HOME | End: 2024-05-02
Attending: EMERGENCY MEDICINE
Payer: COMMERCIAL

## 2024-05-02 VITALS
OXYGEN SATURATION: 98 % | RESPIRATION RATE: 20 BRPM | HEART RATE: 69 BPM | DIASTOLIC BLOOD PRESSURE: 86 MMHG | SYSTOLIC BLOOD PRESSURE: 136 MMHG | WEIGHT: 135 LBS | TEMPERATURE: 99 F | BODY MASS INDEX: 23 KG/M2

## 2024-05-02 DIAGNOSIS — R20.2 PARESTHESIAS: ICD-10-CM

## 2024-05-02 DIAGNOSIS — S90.32XA CONTUSION OF LEFT FOOT, INITIAL ENCOUNTER: Primary | ICD-10-CM

## 2024-05-02 PROCEDURE — 99284 EMERGENCY DEPT VISIT MOD MDM: CPT

## 2024-05-02 PROCEDURE — 99283 EMERGENCY DEPT VISIT LOW MDM: CPT

## 2024-05-02 PROCEDURE — 73630 X-RAY EXAM OF FOOT: CPT | Performed by: EMERGENCY MEDICINE

## 2024-05-02 RX ORDER — IBUPROFEN 600 MG/1
600 TABLET ORAL EVERY 8 HOURS PRN
Qty: 30 TABLET | Refills: 0 | Status: SHIPPED | OUTPATIENT
Start: 2024-05-02 | End: 2024-05-09

## 2024-05-03 NOTE — ED PROVIDER NOTES
Patient Seen in: [facility] Emergency Department    History     Chief Complaint   Patient presents with    Leg or Foot Injury       HPI    History is provided by patient/independent historian: patient  53 year old female with history of hypertension, lupus, here with complaints of left foot injury 4 days ago.  Patient states that she was out of town at a conference and initially tripped on a curb.  She subsequently reinjured the foot yesterday by stubbing it on an ottoman. She returned home today and has not been able to bear weight, and is reporting tingling to the foot. Her physician family member was concerned for circulation vs. Nerve injury.    History reviewed.   Past Medical History:    Anemia    Essential hypertension    H. pylori infection    Hypertension    Lupus (HCC)         History reviewed.   Past Surgical History:   Procedure Laterality Date    Myomectomy 5/> intramural myomas &/or total wt >250 gms, abdominal approach  12/22/2021    Oophorectomy  2021    Other      widom teeth x 4    Other surgical history  12/22/2021    endometrioma removal in [facility] Medications reviewed :  (Not in a hospital admission)        History reviewed.   Social History     Socioeconomic History    Marital status:    Tobacco Use    Smoking status: Never    Smokeless tobacco: Never   Vaping Use    Vaping status: Never Used   Substance and Sexual Activity    Alcohol use: Yes     Comment: social    Drug use: Never   Other Topics Concern    Reaction to local anesthetic No         ROS  Review of Systems   Respiratory:  Negative for shortness of breath.    Cardiovascular:  Negative for chest pain.   Musculoskeletal:  Positive for arthralgias, gait problem and joint swelling.   All other systems reviewed and are negative.     All other pertinent organ systems are reviewed and are negative.      Physical Exam     ED Triage Vitals [05/02/24 2206]   /86   Pulse 69   Resp 20   Temp 98.6 °F (37 °C)    Temp src Temporal   SpO2 98 %   O2 Device None (Room air)     Vital signs reviewed.      Physical Exam  Vitals and nursing note reviewed.   Cardiovascular:      Pulses: Normal pulses.   Pulmonary:      Effort: No respiratory distress.   Abdominal:      General: There is no distension.   Musculoskeletal:      Comments: Left foot diffusely swollen, brisk cap refill, tenderness to palpation over 2/3rd metatarsal heads, no nail involvement, able to wiggle toes, no malleoli tenderness, achilles tendon intact, no calcaneal tenderness, 2+ DP pulse   Neurological:      Mental Status: She is alert.         ED Course       Labs:   Labs Reviewed - No data to display      My EKG Interpretation:   As reviewed and Interpreted by me      Imaging Results Available and Reviewed while in ED:   No results found.  X-ray left foot 22:57      IMPRESSION:  -No evidence of acute fracture or dislocation of the left foot.  -No soft tissue radiopaque foreign bodies.      Case dictated and faxed at 12:15 AM EST.  If you would like to discuss this case directly please call [phone]  ext [phone].  If you can't reach me at this number, do not leave a voicemail.  Please call [phone] ext [phone] and ask for the next available Radiologist.    Edward Patterson MD  This report has been electronically signed and verified by the Radiologist whose name is printed above.  My review and independent interpretation of XR images: no fracture. Radiology report corroborates this in addition to other details as reported by them.      Decision rules/scores evaluated: none      Diagnostic labs/tests considered but not ordered: CBC, BMP, type and screen    ED Medications Administered: Medications - No data to display             [facility]       Medical Decision Making      Differential Diagnosis: After obtaining the patient's history, performing the physical exam and reviewing the diagnostics, multiple initial diagnoses were considered based on the presenting problem  including fracture, lisfranc, dislocation, contusion, tendon rupture    External document review: I personally reviewed available external medical records for any recent pertinent discharge summaries, testing, and procedures - the findings are as follows: 12/22/23 visit with Dr. Branch for asymmetrical hearing los    Complicating Factors: The patient already  has a past medical history of Anemia, Essential hypertension, H. pylori infection, Hypertension, and Lupus (HCC). to contribute to the complexity of this ED evaluation.    Procedures performed: none    Discussed management with physician/appropriate source: none    Considered admission/deescalation of care for: none    Social determinants of health affecting patient care: none    Prescription medications considered: prescription strength ibuprofen, discussed continuing current medication regimen    The patient requires continuous monitoring for: foot pain    Shared decision making: none        Disposition and Plan     Clinical Impression:  1. Contusion of left foot, initial encounter    2. Paresthesias        Disposition:  Discharge    Follow-up:  Crista Calvillo MD  [address] [place] [id number]  [phone]    Follow up        Medications Prescribed:  Discharge Medication List as of 5/2/2024 11:33 PM        START taking these medications    Details   ibuprofen 600 MG Oral Tab Take 1 tablet (600 mg total) by mouth every 8 (eight) hours as needed for Pain or Fever., Normal, Disp-30 tablet, R-0

## 2024-05-03 NOTE — ED INITIAL ASSESSMENT (HPI)
Pt presents to ed with c/o left foot injury. Pt states she tripped a couple days ago and pain in her left foot worsened yesterday. Reports worsening pain and swelling after last night.     Pt unable to bear weight on left foot d/t pain.     Advil around 6pm with no relief.

## 2025-07-01 NOTE — TELEPHONE ENCOUNTER
CBLM to schedule procedure. Please transfer to Donald Mckeon at ext [phone] for scheduling. EP was completing a note on this patient and wanted to verify this patient's Amiodarone.     They were wondering if the patient's increase in patient's Amiodorone made at last visit on 5/13/25 to 200mg BID was only until the ANTIONE/DCCV on 5/24 OR until next echo OR until further notice? Should still be continuing this OR if she should go back to the 200mg dose that she was on prior?     Will review with provider and get back to EP and the patient with any adjustments if needed.

## (undated) NOTE — LETTER
01/24/23        [address] [facility]  [address] [address]      Dear Markus Perez,    [facility] records indicate that you have outstanding lab work and or testing that was ordered for you and has not yet been completed:  HELICOBACTER PYLORI BREATH TEST, ADULT (>17) (Order #[id number]) on 12/17/22  To provide you with the best possible care, please complete these orders at your earliest convenience. If you have recently completed these orders please disregard this letter. If you have any questions please call the office at [phone].     Thank you,   Kayden Yeh MD  -    -